# Patient Record
Sex: MALE | Employment: UNEMPLOYED | URBAN - METROPOLITAN AREA
[De-identification: names, ages, dates, MRNs, and addresses within clinical notes are randomized per-mention and may not be internally consistent; named-entity substitution may affect disease eponyms.]

---

## 2019-04-12 ENCOUNTER — OFFICE VISIT (OUTPATIENT)
Dept: FAMILY MEDICINE CLINIC | Facility: CLINIC | Age: 59
End: 2019-04-12
Payer: COMMERCIAL

## 2019-04-12 VITALS
TEMPERATURE: 99 F | OXYGEN SATURATION: 98 % | SYSTOLIC BLOOD PRESSURE: 121 MMHG | DIASTOLIC BLOOD PRESSURE: 75 MMHG | HEART RATE: 67 BPM | WEIGHT: 175 LBS | RESPIRATION RATE: 20 BRPM | HEIGHT: 69 IN | BODY MASS INDEX: 25.92 KG/M2

## 2019-04-12 DIAGNOSIS — J20.9 ACUTE BRONCHITIS, UNSPECIFIED ORGANISM: Primary | ICD-10-CM

## 2019-04-12 PROCEDURE — 99213 OFFICE O/P EST LOW 20 MIN: CPT | Performed by: PHYSICIAN ASSISTANT

## 2019-04-12 RX ORDER — AZITHROMYCIN 250 MG/1
TABLET, FILM COATED ORAL
Qty: 6 TABLET | Refills: 0 | Status: SHIPPED | OUTPATIENT
Start: 2019-04-12

## 2019-04-12 NOTE — PROGRESS NOTES
CHIEF COMPLAINT:   Patient presents with:  Cold: s/s for 2 weeks  Chest Congestion: wet cough. chest tightness at night,  clear mucous No OTC meds taken    HPI:   Joshua Hughes is a 60year old male who presents for cough for  2  weeks.   Cough started grad SKIN: no rashes, no suspicious lesions  EYES: Conjunctiva clear. HENT: Atraumatic, normocephalic. TM's clear bilaterally. Nostrils patent, nasal mucosa pink and minimally inflamed. No sinus tenderness with palpation. No erythema of the throat.   NECK: Acute bronchitis is when the airways in your lungs (bronchial tubes) become red and swollen (inflamed). It is usually caused by a viral infection. But it can also occur because of a bacteria or allergen.  Symptoms include a cough that produces yellow or gre · A chest X-ray. This is done if your healthcare provider thinks you have pneumonia. · Tests to check for an underlying condition. Other tests may be done to check for things such as allergies, asthma, or COPD (chronic obstructive pulmonary disease).  You · Take the medicines as directed. For instance, some medicines should be taken with food. · Ask about side effects. Ask your provider or pharmacist what side effects are common, and what to do about them.   Follow-up care  You should see your provider venessa

## 2019-04-12 NOTE — PATIENT INSTRUCTIONS
Please follow up with your PCP if no improvement within 5-7 days. Go directly to the ER for any acute worsening of symptoms. What Is Acute Bronchitis?   Acute bronchitis is when the airways in your lungs (bronchial tubes) become red and swollen (inflame lungs.  · A nasal or throat swab. This tests to see if you have a bacterial infection. · A chest X-ray. This is done if your healthcare provider thinks you have pneumonia. · Tests to check for an underlying condition.  Other tests may be done to check for don’t, the bronchitis may come back. · Take the medicines as directed. For instance, some medicines should be taken with food. · Ask about side effects. Ask your provider or pharmacist what side effects are common, and what to do about them.   Follow-up c